# Patient Record
Sex: MALE | Race: WHITE | NOT HISPANIC OR LATINO | URBAN - METROPOLITAN AREA
[De-identification: names, ages, dates, MRNs, and addresses within clinical notes are randomized per-mention and may not be internally consistent; named-entity substitution may affect disease eponyms.]

---

## 2022-05-29 ENCOUNTER — OUTPATIENT (OUTPATIENT)
Dept: OUTPATIENT SERVICES | Facility: HOSPITAL | Age: 57
LOS: 1 days | End: 2022-05-29
Payer: COMMERCIAL

## 2022-05-29 DIAGNOSIS — Z20.828 CONTACT WITH AND (SUSPECTED) EXPOSURE TO OTHER VIRAL COMMUNICABLE DISEASES: ICD-10-CM

## 2022-05-29 LAB — SARS-COV-2 RNA SPEC QL NAA+PROBE: SIGNIFICANT CHANGE UP

## 2022-05-29 PROCEDURE — U0003: CPT

## 2022-05-29 PROCEDURE — U0005: CPT

## 2022-06-01 ENCOUNTER — OUTPATIENT (OUTPATIENT)
Dept: OUTPATIENT SERVICES | Facility: HOSPITAL | Age: 57
LOS: 1 days | End: 2022-06-01
Payer: COMMERCIAL

## 2022-06-01 VITALS
HEART RATE: 79 BPM | RESPIRATION RATE: 18 BRPM | DIASTOLIC BLOOD PRESSURE: 79 MMHG | OXYGEN SATURATION: 100 % | SYSTOLIC BLOOD PRESSURE: 109 MMHG

## 2022-06-01 VITALS
HEIGHT: 68 IN | OXYGEN SATURATION: 94 % | TEMPERATURE: 98 F | WEIGHT: 168.43 LBS | RESPIRATION RATE: 18 BRPM | DIASTOLIC BLOOD PRESSURE: 77 MMHG | SYSTOLIC BLOOD PRESSURE: 126 MMHG | HEART RATE: 77 BPM

## 2022-06-01 DIAGNOSIS — E11.3511 TYPE 2 DIABETES MELLITUS WITH PROLIFERATIVE DIABETIC RETINOPATHY WITH MACULAR EDEMA, RIGHT EYE: ICD-10-CM

## 2022-06-01 DIAGNOSIS — H43.11 VITREOUS HEMORRHAGE, RIGHT EYE: ICD-10-CM

## 2022-06-01 DIAGNOSIS — E11.3531 TYPE 2 DIABETES MELLITUS WITH PROLIFERATIVE DIABETIC RETINOPATHY WITH TRACTION RETINAL DETACHMENT NOT INVOLVING THE MACULA, RIGHT EYE: ICD-10-CM

## 2022-06-01 LAB
GLUCOSE BLDC GLUCOMTR-MCNC: 128 MG/DL — HIGH (ref 70–99)
GLUCOSE BLDC GLUCOMTR-MCNC: 72 MG/DL — SIGNIFICANT CHANGE UP (ref 70–99)
GLUCOSE BLDC GLUCOMTR-MCNC: 77 MG/DL — SIGNIFICANT CHANGE UP (ref 70–99)
GLUCOSE BLDC GLUCOMTR-MCNC: 93 MG/DL — SIGNIFICANT CHANGE UP (ref 70–99)
GLUCOSE BLDC GLUCOMTR-MCNC: 99 MG/DL — SIGNIFICANT CHANGE UP (ref 70–99)

## 2022-06-01 PROCEDURE — 67113 REPAIR RETINAL DETACH CPLX: CPT | Mod: RT

## 2022-06-01 PROCEDURE — 82962 GLUCOSE BLOOD TEST: CPT

## 2022-06-01 PROCEDURE — C1889: CPT

## 2022-06-01 DEVICE — LASER PROBE 23G CONSTELLATION
Type: IMPLANTABLE DEVICE | Site: RIGHT EYE | Status: NON-FUNCTIONAL
Removed: 2022-06-01

## 2022-06-01 DEVICE — GS SF6 SULFER HEXAFLUORIDE 3 L 20GM
Type: IMPLANTABLE DEVICE | Site: RIGHT EYE | Status: NON-FUNCTIONAL
Removed: 2022-06-01

## 2022-06-01 NOTE — ASU DISCHARGE PLAN (ADULT/PEDIATRIC) - CARE PROVIDER_API CALL
Joe Quevedo)  Ophthalmology  [address] [facility], [address]  [place] [address]  Phone: [phone]  Fax: [phone]  Scheduled Appointment: 06/02/2022 08:00 AM

## 2022-06-01 NOTE — ASU DISCHARGE PLAN (ADULT/PEDIATRIC) - ASU DC SPECIAL INSTRUCTIONSFT
FACE DOWN POSITION AS MUCH AS POSSIBLE  RIGHT SIDE DOWN AT NIGHT IF CANNOT DO FACE DOWN    Tylenol as directed asneeded

## 2022-06-01 NOTE — ASU PATIENT PROFILE, ADULT - VISION (WITH CORRECTIVE LENSES IF THE PATIENT USUALLY WEARS THEM):
B/l eyes blurry/Partially impaired: cannot see medication labels or newsprint, but can see obstacles in path, and the surrounding layout; can count fingers at arm's length

## 2022-06-01 NOTE — ASU PATIENT PROFILE, ADULT - FALL HARM RISK - UNIVERSAL INTERVENTIONS
Bed in lowest position, wheels locked, appropriate side rails in place/Call bell, personal items and telephone in reach/Instruct patient to call for assistance before getting out of bed or chair/Non-slip footwear when patient is out of bed/[place] to call system/Physically safe environment - no spills, clutter or unnecessary equipment/Purposeful Proactive Rounding/Room/bathroom lighting operational, light cord in reach

## 2022-06-01 NOTE — ASU DISCHARGE PLAN (ADULT/PEDIATRIC) - NS MD DC FALL RISK RISK
For information on Fall & Injury Prevention, visit: [email or web address] OR  [email or web address] OR  https://www.cdc.gov/steadi/patient.html

## 2022-06-08 ENCOUNTER — OUTPATIENT (OUTPATIENT)
Dept: OUTPATIENT SERVICES | Facility: HOSPITAL | Age: 57
LOS: 1 days | End: 2022-06-08
Payer: COMMERCIAL

## 2022-06-08 DIAGNOSIS — Z20.828 CONTACT WITH AND (SUSPECTED) EXPOSURE TO OTHER VIRAL COMMUNICABLE DISEASES: ICD-10-CM

## 2022-06-08 LAB — SARS-COV-2 RNA SPEC QL NAA+PROBE: SIGNIFICANT CHANGE UP

## 2022-06-08 PROCEDURE — U0003: CPT

## 2022-06-08 PROCEDURE — U0005: CPT

## 2022-06-10 ENCOUNTER — OUTPATIENT (OUTPATIENT)
Dept: OUTPATIENT SERVICES | Facility: HOSPITAL | Age: 57
LOS: 1 days | End: 2022-06-10
Payer: COMMERCIAL

## 2022-06-10 VITALS
HEART RATE: 85 BPM | RESPIRATION RATE: 16 BRPM | SYSTOLIC BLOOD PRESSURE: 129 MMHG | DIASTOLIC BLOOD PRESSURE: 61 MMHG | OXYGEN SATURATION: 97 %

## 2022-06-10 VITALS
SYSTOLIC BLOOD PRESSURE: 120 MMHG | RESPIRATION RATE: 14 BRPM | OXYGEN SATURATION: 95 % | HEART RATE: 77 BPM | DIASTOLIC BLOOD PRESSURE: 62 MMHG | WEIGHT: 169.32 LBS | HEIGHT: 68 IN | TEMPERATURE: 98 F

## 2022-06-10 DIAGNOSIS — E11.3532 TYPE 2 DIABETES MELLITUS WITH PROLIFERATIVE DIABETIC RETINOPATHY WITH TRACTION RETINAL DETACHMENT NOT INVOLVING THE MACULA, LEFT EYE: ICD-10-CM

## 2022-06-10 DIAGNOSIS — Z86.69 PERSONAL HISTORY OF OTHER DISEASES OF THE NERVOUS SYSTEM AND SENSE ORGANS: Chronic | ICD-10-CM

## 2022-06-10 LAB
GLUCOSE BLDC GLUCOMTR-MCNC: 109 MG/DL — HIGH (ref 70–99)
GLUCOSE BLDC GLUCOMTR-MCNC: 84 MG/DL — SIGNIFICANT CHANGE UP (ref 70–99)

## 2022-06-10 PROCEDURE — C1889: CPT

## 2022-06-10 PROCEDURE — 82962 GLUCOSE BLOOD TEST: CPT

## 2022-06-10 PROCEDURE — 67113 REPAIR RETINAL DETACH CPLX: CPT | Mod: LT

## 2022-06-10 DEVICE — LASER PROBE 25G CONSTELLATION: Type: IMPLANTABLE DEVICE | Site: LEFT | Status: FUNCTIONAL

## 2022-06-10 NOTE — ASU PATIENT PROFILE, ADULT - FALL HARM RISK - UNIVERSAL INTERVENTIONS
Bed in lowest position, wheels locked, appropriate side rails in place/Call bell, personal items and telephone in reach/Instruct patient to call for assistance before getting out of bed or chair/Non-slip footwear when patient is out of bed/[place] to call system/Physically safe environment - no spills, clutter or unnecessary equipment/Purposeful Proactive Rounding/Room/bathroom lighting operational, light cord in reach

## 2022-06-10 NOTE — ASU PREOP CHECKLIST - SELECT TESTS ORDERED
----- Message from Lena Yip MD sent at 1/17/2019  9:16 PM EST -----  Please call - her mammo is neg  
Pt informed, states understanding.     
COVID-19

## 2022-06-10 NOTE — ASU DISCHARGE PLAN (ADULT/PEDIATRIC) - NS MD DC FALL RISK RISK
For information on Fall & Injury Prevention, visit: [email or web address] OR  [email or web address] OR  https://www.cdc.gov/steadi/patient.html

## 2022-06-10 NOTE — ASU DISCHARGE PLAN (ADULT/PEDIATRIC) - CARE PROVIDER_API CALL
Estuardo Angeles)  Ophthalmology  [address] [place] [address], [address]  [place] [address]  Phone: [phone]  Fax: [phone]  Scheduled Appointment: 06/11/2022 08:45 AM

## 2024-06-18 NOTE — ASU PREOP CHECKLIST - ASSESSMENT, HISTORY & PHYSICAL COMPLETED AND ON MEDICAL RECORD
This patient has been identified as being eligible for the Care Transitions program, a post-hospital discharge program designed to decrease readmission risk and increase continuity of care for patients. Awaiting discharge determination.  
done

## 2025-02-28 ENCOUNTER — APPOINTMENT (OUTPATIENT)
Dept: CARDIOLOGY | Facility: CLINIC | Age: 60
End: 2025-02-28
Payer: COMMERCIAL

## 2025-02-28 ENCOUNTER — NON-APPOINTMENT (OUTPATIENT)
Age: 60
End: 2025-02-28

## 2025-02-28 VITALS
WEIGHT: 184 LBS | HEIGHT: 68 IN | OXYGEN SATURATION: 96 % | HEART RATE: 95 BPM | RESPIRATION RATE: 16 BRPM | DIASTOLIC BLOOD PRESSURE: 82 MMHG | BODY MASS INDEX: 27.89 KG/M2 | SYSTOLIC BLOOD PRESSURE: 134 MMHG

## 2025-02-28 DIAGNOSIS — R06.02 SHORTNESS OF BREATH: ICD-10-CM

## 2025-02-28 DIAGNOSIS — Z86.39 PERSONAL HISTORY OF OTHER ENDOCRINE, NUTRITIONAL AND METABOLIC DISEASE: ICD-10-CM

## 2025-02-28 PROBLEM — Z00.00 ENCOUNTER FOR PREVENTIVE HEALTH EXAMINATION: Status: ACTIVE | Noted: 2025-02-28

## 2025-02-28 PROCEDURE — 93000 ELECTROCARDIOGRAM COMPLETE: CPT

## 2025-02-28 PROCEDURE — 99204 OFFICE O/P NEW MOD 45 MIN: CPT | Mod: 25

## 2025-03-26 ENCOUNTER — NON-APPOINTMENT (OUTPATIENT)
Age: 60
End: 2025-03-26

## 2025-03-28 ENCOUNTER — APPOINTMENT (OUTPATIENT)
Dept: CARDIOLOGY | Facility: CLINIC | Age: 60
End: 2025-03-28
Payer: COMMERCIAL

## 2025-03-28 VITALS — DIASTOLIC BLOOD PRESSURE: 71 MMHG | SYSTOLIC BLOOD PRESSURE: 137 MMHG

## 2025-03-28 PROCEDURE — 93306 TTE W/DOPPLER COMPLETE: CPT

## 2025-03-28 PROCEDURE — 93015 CV STRESS TEST SUPVJ I&R: CPT

## (undated) DEVICE — INSTR GRIESHABER REVOLUTION SCISSOR 23G (VERTICAL) DISP

## (undated) DEVICE — CANNULA MEDONE FLEXTIP 25G

## (undated) DEVICE — LIGHT SHIELD CORNEAL

## (undated) DEVICE — INSTR EYE HANDPIECE PNEUMATIC

## (undated) DEVICE — SYE-CONSTELLATION MACHINE 1003327909X: Type: DURABLE MEDICAL EQUIPMENT

## (undated) DEVICE — WRAP COMPRESSION CALF MED

## (undated) DEVICE — SUT VICRYL 7-0 12" TG140-8 DA

## (undated) DEVICE — LENS DISP FLAT VIT

## (undated) DEVICE — BLANKET WARMER LOWER ADULT

## (undated) DEVICE — CONSTELLATION TOTAL PLUS PAK 23G

## (undated) DEVICE — NDL HYPO SAFE 22G X 1.5"

## (undated) DEVICE — DRAPE STERI-DRAPE INCISE 13X13"

## (undated) DEVICE — PROBE TIP DIATHERMY DISP TAPR 25GA

## (undated) DEVICE — SOL TISSUE BLUE OPTH PRE-FIELD SYR STRL 0.5ML 5/BX

## (undated) DEVICE — Device

## (undated) DEVICE — SOL IRR BAL SALT + 500ML

## (undated) DEVICE — AUTO GAS FILL CONSTELLATION

## (undated) DEVICE — ELCTR BIPOLAR CORD J&J 12FT DISP

## (undated) DEVICE — CANNULA MEDONE FLEXTIP 23G

## (undated) DEVICE — DRSG MASTISOL

## (undated) DEVICE — SOL BALANCE SALT 15ML

## (undated) DEVICE — GLV 7 PROTEXIS

## (undated) DEVICE — GLV 8 PROTEXIS

## (undated) DEVICE — BLADE 23G MVR